# Patient Record
(demographics unavailable — no encounter records)

---

## 2024-11-20 NOTE — ASSESSMENT
[FreeTextEntry1] :  The patient is being seen today for right elbow pain present for 1 month. There was no specific trauma to the site, she thinks it may be from overuse.  full active range of motion of the right shoulder, wrist and fingers full active range of motion of the right elbow Tenderness to palpation of the lateral epicondyle and proximal extensor supinator mass pain with resisted wrist extension and forearm supination 4/5 strength in supination and wrist extension, otherwise 5/5 strength median/ulnar/radial sensation intact to light touch ain/pin/ulnar motor intact palpable pulses CR<2s  The patient was advised of the diagnosis. The natural history of the pathology was explained in full to the patient in layman's terms. All questions were answered. The risks and benefits of surgical and non-surgical treatment alternatives were explained in full to the patient.   We discussed treatment options including nsaids, topical gels, tennis elbow strap, PT, activity modification, cortisone inj, sx .pt is aware that symptoms usually resolve on their own in 95-99% of people, but the timeframe is unknown.   Home exercises/stretches were demonstrated and the patient practiced them as well- They are to do the exercises for 5 minutes 4x a day.   Avoid repetitive wrist flexion   time was spent instructing the patient on appropriate placement of the elbow strap as well. I recommend using heat to help any stiffness and icing it down afterwards. She will follow up in about 2 months for evaluation if she is not improving to start formal therapy.

## 2025-06-04 NOTE — IMAGING
[de-identified] : On examination patient has good range of motion to internal and external rotation of the right hip no active groin pain.  No pain to palpation of the lateral hip.  Tenderness is noted over the right paraspinal muscle and right gluteal region.  No tenderness of the lumbar vertebra.  Negative straight leg raise on the right.  Good motion of the right knee.  Ambulating without an assistive device.  X-ray pelvis right hip 2 views no fracture or dislocation X-ray lumbar spine 4 views no acute bony normality degenerative change

## 2025-06-04 NOTE — ASSESSMENT
[FreeTextEntry1] : Impression right hip, lumbar contusion-strain.  I do feel most of her discomfort is stemming from her lumbar spine.  Recommending heat, over-the-counter anti-inflammatories.  She kindly declined formal physical therapy.  She recently signed with a  in the gym I do recommend that she rest for couple weeks prior to returning back.  Follow-up in our spine department if the pain worsens or continues.

## 2025-06-04 NOTE — HISTORY OF PRESENT ILLNESS
[de-identified] : 65-year female comes in today for evaluation of her right hip and lower back pain.  Patient had a fall in the gym May 29th onto the right side.